# Patient Record
Sex: MALE | Race: WHITE | Employment: OTHER | URBAN - METROPOLITAN AREA
[De-identification: names, ages, dates, MRNs, and addresses within clinical notes are randomized per-mention and may not be internally consistent; named-entity substitution may affect disease eponyms.]

---

## 2018-07-10 ENCOUNTER — OFFICE VISIT (OUTPATIENT)
Dept: DERMATOLOGY | Age: 63
End: 2018-07-10

## 2018-07-10 DIAGNOSIS — L70.0 ACNE VULGARIS: ICD-10-CM

## 2018-07-10 DIAGNOSIS — R21 RASH: Primary | ICD-10-CM

## 2018-07-10 DIAGNOSIS — D22.9 BENIGN NEVUS: ICD-10-CM

## 2018-07-10 DIAGNOSIS — L57.8 DIFFUSE PHOTODAMAGE OF SKIN: ICD-10-CM

## 2018-07-10 DIAGNOSIS — L91.8 INFLAMED SKIN TAG: ICD-10-CM

## 2018-07-10 PROCEDURE — 99214 OFFICE O/P EST MOD 30 MIN: CPT | Performed by: DERMATOLOGY

## 2018-07-10 PROCEDURE — 11200 RMVL SKIN TAGS UP TO&INC 15: CPT | Performed by: DERMATOLOGY

## 2018-07-10 RX ORDER — LOVASTATIN 10 MG/1
10 TABLET ORAL NIGHTLY
COMMUNITY

## 2018-07-10 RX ORDER — DOXYCYCLINE HYCLATE 100 MG
TABLET ORAL
Qty: 60 TABLET | Refills: 3 | Status: SHIPPED | OUTPATIENT
Start: 2018-07-10 | End: 2018-12-12 | Stop reason: SDUPTHER

## 2018-07-10 RX ORDER — LISINOPRIL 20 MG/1
20 TABLET ORAL DAILY
COMMUNITY

## 2018-07-10 NOTE — PROGRESS NOTES
Narrative    No narrative on file       Physical Examination       -General: Well-appearing, NAD  1. Normal affect. Total body skin exam including scalp, face, neck, chest, abdomen, back, bilateral upper extremities, bilateral lower extremities, ocular conjunctiva, external lips, and nails was performed. Examination normal unless stated below. Underwear area not examined. Scattered on the trunk and extremities are multiple well-defined round and oval symmetric smoothly-bordered uniformly brown macules and papules. Multiple clustered erythematous papules-scale-right buttock  Diffuse background photodamage especially of chest.  No discrete lesion concerning for skin cancer. Excoriation left chest with appropriate scab, multiple associated lentigines. 1-2+ inflammatory acne back and cheeks with old scarring  2 pedunculated 3 mm visibly erythematous skin tags inner thighs        Assessment and Plan     1. Rash -Rule out herpes simplex-PCR ordered today. Will prescribe Valtrex if positive for recurrences    2. Diffuse photodamage of skin -Hats, sunscreen, sun avoidance. Discussed lentigines and importance of monitoring them for change. Patient will monitor excoriation left chest for resolution    3. Benign nevus - Benign acquired melanocytic nevi  -Recommend monthly self skin exams   -Educated regarding the ABCDEs of melanoma detection   -Call for any new/changing moles or concerning lesions  -Reviewed sun protective behavior -- sun avoidance during the peak hours of the day, sun-protective clothing (including hat and sunglasses), sunscreen use (water resistant, broad spectrum, SPF at least 30, need for reapplication every 2 to 3 hours), avoidance of tanning beds      4. Acne vulgaris -Start doxycycline 100 mg p.o. b.i.d. and benzoyl peroxide wash. Reviewed side effects and contraindications. Recheck 3 months    5. Inflamed skin tag - 2, inner thighs lesion(s) treated w/ liquid nitrogen.  Edu re: risk of

## 2018-07-12 LAB
HERPES SIMPLEX VIRUS BY PCR: DETECTED
HSV SOURCE: ABNORMAL

## 2018-07-16 RX ORDER — VALACYCLOVIR HYDROCHLORIDE 500 MG/1
TABLET, FILM COATED ORAL
Qty: 6 TABLET | Refills: 5 | Status: SHIPPED | OUTPATIENT
Start: 2018-07-16

## 2018-07-17 ENCOUNTER — TELEPHONE (OUTPATIENT)
Dept: DERMATOLOGY | Age: 63
End: 2018-07-17

## 2018-07-17 NOTE — TELEPHONE ENCOUNTER
Called and LM for pt to call the office. Pls let patient know rash consistent with herpes virus as discussed at appt. If he feels an outbreak starting, start script for valtrex that was sent to pharmacy. He only takes this for outbreaks.

## 2018-07-23 NOTE — TELEPHONE ENCOUNTER
Patient called in regards to his biopsy results. He would like for the results left on his voice mail.       Call back # [phone] or [facility]/[phone]

## 2018-07-24 NOTE — TELEPHONE ENCOUNTER
Left message on voicemail \"Pls let patient know rash consistent with herpes virus as discussed at appt. If he feels an outbreak starting, start script for valtrex that was sent to pharmacy. He only takes this for outbreaks. \"

## 2018-12-12 DIAGNOSIS — L70.0 ACNE VULGARIS: ICD-10-CM

## 2018-12-13 RX ORDER — DOXYCYCLINE HYCLATE 100 MG
TABLET ORAL
Qty: 60 TABLET | Refills: 2 | Status: SHIPPED | OUTPATIENT
Start: 2018-12-13

## 2020-05-07 RX ORDER — VALACYCLOVIR HYDROCHLORIDE 500 MG/1
TABLET, FILM COATED ORAL
Qty: 6 TABLET | Refills: 4 | OUTPATIENT
Start: 2020-05-07